# Patient Record
Sex: FEMALE | Race: BLACK OR AFRICAN AMERICAN | NOT HISPANIC OR LATINO | URBAN - METROPOLITAN AREA
[De-identification: names, ages, dates, MRNs, and addresses within clinical notes are randomized per-mention and may not be internally consistent; named-entity substitution may affect disease eponyms.]

---

## 2017-06-28 ENCOUNTER — EMERGENCY (EMERGENCY)
Facility: HOSPITAL | Age: 32
LOS: 0 days | Discharge: HOME | End: 2017-06-28
Attending: EMERGENCY MEDICINE
Payer: MEDICAID

## 2017-06-28 VITALS
OXYGEN SATURATION: 98 % | HEART RATE: 57 BPM | WEIGHT: 156.09 LBS | RESPIRATION RATE: 16 BRPM | HEIGHT: 63 IN | TEMPERATURE: 99 F | SYSTOLIC BLOOD PRESSURE: 116 MMHG | DIASTOLIC BLOOD PRESSURE: 86 MMHG

## 2017-06-28 PROCEDURE — 99283 EMERGENCY DEPT VISIT LOW MDM: CPT

## 2017-06-28 RX ORDER — IBUPROFEN 200 MG
600 TABLET ORAL ONCE
Qty: 0 | Refills: 0 | Status: COMPLETED | OUTPATIENT
Start: 2017-06-28 | End: 2017-06-28

## 2017-06-28 RX ORDER — IBUPROFEN 200 MG
1 TABLET ORAL
Qty: 15 | Refills: 0 | OUTPATIENT
Start: 2017-06-28 | End: 2017-07-03

## 2017-06-28 RX ORDER — PENICILLIN V POTASSIUM 250 MG
1 TABLET ORAL
Qty: 28 | Refills: 0 | OUTPATIENT
Start: 2017-06-28 | End: 2017-07-05

## 2017-06-28 RX ORDER — PENICILLIN V POTASSIUM 250 MG
500 TABLET ORAL ONCE
Qty: 0 | Refills: 0 | Status: COMPLETED | OUTPATIENT
Start: 2017-06-28 | End: 2017-06-28

## 2017-06-28 RX ADMIN — Medication 600 MILLIGRAM(S): at 20:57

## 2017-06-28 RX ADMIN — Medication 500 MILLIGRAM(S): at 20:57

## 2017-06-28 NOTE — ED PROVIDER NOTE - ENMT, MLM
Airway patent, Nasal mucosa clear. Mouth with normal mucosa. Throat has no vesicles, no oropharyngeal exudates and uvula is midline. Right lower molar Griggs 1 fracture, no abscess

## 2017-06-28 NOTE — ED ADULT TRIAGE NOTE - CHIEF COMPLAINT QUOTE
Patient reports "Bad headache from tooth." Denies photophobia. Denies nausea or vomiting. Pain radiates from jaw to head. Pain present since this afternoon. Patient reports "cavity." Denies head trauma Patient actively breast feeding.

## 2017-06-29 DIAGNOSIS — K02.9 DENTAL CARIES, UNSPECIFIED: ICD-10-CM

## 2017-06-29 DIAGNOSIS — K08.89 OTHER SPECIFIED DISORDERS OF TEETH AND SUPPORTING STRUCTURES: ICD-10-CM

## 2018-08-13 ENCOUNTER — ASOB RESULT (OUTPATIENT)
Age: 33
End: 2018-08-13

## 2018-08-13 ENCOUNTER — APPOINTMENT (OUTPATIENT)
Dept: ANTEPARTUM | Facility: CLINIC | Age: 33
End: 2018-08-13
Payer: MEDICAID

## 2018-08-13 PROCEDURE — 76811 OB US DETAILED SNGL FETUS: CPT

## 2018-08-15 ENCOUNTER — APPOINTMENT (OUTPATIENT)
Dept: ANTEPARTUM | Facility: CLINIC | Age: 33
End: 2018-08-15

## 2018-10-29 ENCOUNTER — APPOINTMENT (OUTPATIENT)
Dept: ANTEPARTUM | Facility: CLINIC | Age: 33
End: 2018-10-29

## 2018-11-21 ENCOUNTER — ASOB RESULT (OUTPATIENT)
Age: 33
End: 2018-11-21

## 2018-11-21 ENCOUNTER — APPOINTMENT (OUTPATIENT)
Dept: ANTEPARTUM | Facility: CLINIC | Age: 33
End: 2018-11-21
Payer: MEDICAID

## 2018-11-21 PROCEDURE — 76819 FETAL BIOPHYS PROFIL W/O NST: CPT

## 2018-11-21 PROCEDURE — 99201 OFFICE OUTPATIENT NEW 10 MINUTES: CPT | Mod: 25,TH

## 2018-11-21 PROCEDURE — 76805 OB US >/= 14 WKS SNGL FETUS: CPT

## 2018-12-05 ENCOUNTER — TRANSCRIPTION ENCOUNTER (OUTPATIENT)
Age: 33
End: 2018-12-05

## 2018-12-05 ENCOUNTER — INPATIENT (INPATIENT)
Facility: HOSPITAL | Age: 33
LOS: 4 days | Discharge: HOME | End: 2018-12-10
Attending: OBSTETRICS & GYNECOLOGY | Admitting: OBSTETRICS & GYNECOLOGY
Payer: MEDICAID

## 2018-12-05 VITALS — HEIGHT: 63 IN | WEIGHT: 176.37 LBS

## 2018-12-05 DIAGNOSIS — Z3A.00 WEEKS OF GESTATION OF PREGNANCY NOT SPECIFIED: ICD-10-CM

## 2018-12-05 DIAGNOSIS — O26.899 OTHER SPECIFIED PREGNANCY RELATED CONDITIONS, UNSPECIFIED TRIMESTER: ICD-10-CM

## 2018-12-05 LAB
BASOPHILS # BLD AUTO: 0.01 K/UL — SIGNIFICANT CHANGE UP (ref 0–0.2)
BASOPHILS NFR BLD AUTO: 0.1 % — SIGNIFICANT CHANGE UP (ref 0–2)
BLD GP AB SCN SERPL QL: NEGATIVE — SIGNIFICANT CHANGE UP
EOSINOPHIL # BLD AUTO: 0.05 K/UL — SIGNIFICANT CHANGE UP (ref 0–0.5)
EOSINOPHIL NFR BLD AUTO: 0.6 % — SIGNIFICANT CHANGE UP (ref 0–6)
HCT VFR BLD CALC: 35.7 % — SIGNIFICANT CHANGE UP (ref 34.5–45)
HGB BLD-MCNC: 11.3 G/DL — LOW (ref 11.5–15.5)
IMM GRANULOCYTES # BLD AUTO: 0.04 # — SIGNIFICANT CHANGE UP
IMM GRANULOCYTES NFR BLD AUTO: 0.5 % — SIGNIFICANT CHANGE UP (ref 0–1.5)
LYMPHOCYTES # BLD AUTO: 1.94 K/UL — SIGNIFICANT CHANGE UP (ref 1–3.3)
LYMPHOCYTES # BLD AUTO: 23 % — SIGNIFICANT CHANGE UP (ref 13–44)
MCHC RBC-ENTMCNC: 27.2 PG — SIGNIFICANT CHANGE UP (ref 27–34)
MCHC RBC-ENTMCNC: 31.7 % — LOW (ref 32–36)
MCV RBC AUTO: 85.8 FL — SIGNIFICANT CHANGE UP (ref 80–100)
MONOCYTES # BLD AUTO: 0.71 K/UL — SIGNIFICANT CHANGE UP (ref 0–0.9)
MONOCYTES NFR BLD AUTO: 8.4 % — SIGNIFICANT CHANGE UP (ref 2–14)
NEUTROPHILS # BLD AUTO: 5.67 K/UL — SIGNIFICANT CHANGE UP (ref 1.8–7.4)
NEUTROPHILS NFR BLD AUTO: 67.4 % — SIGNIFICANT CHANGE UP (ref 43–77)
NRBC # FLD: 0 — SIGNIFICANT CHANGE UP
PLATELET # BLD AUTO: 125 K/UL — LOW (ref 150–400)
PMV BLD: 12.2 FL — SIGNIFICANT CHANGE UP (ref 7–13)
RBC # BLD: 4.16 M/UL — SIGNIFICANT CHANGE UP (ref 3.8–5.2)
RBC # FLD: 16 % — HIGH (ref 10.3–14.5)
RH IG SCN BLD-IMP: POSITIVE — SIGNIFICANT CHANGE UP
WBC # BLD: 8.42 K/UL — SIGNIFICANT CHANGE UP (ref 3.8–10.5)
WBC # FLD AUTO: 8.42 K/UL — SIGNIFICANT CHANGE UP (ref 3.8–10.5)

## 2018-12-05 RX ORDER — SODIUM CHLORIDE 9 MG/ML
1000 INJECTION, SOLUTION INTRAVENOUS
Qty: 0 | Refills: 0 | Status: DISCONTINUED | OUTPATIENT
Start: 2018-12-05 | End: 2018-12-06

## 2018-12-05 RX ORDER — OXYTOCIN 10 UNIT/ML
333.33 VIAL (ML) INJECTION
Qty: 20 | Refills: 0 | Status: DISCONTINUED | OUTPATIENT
Start: 2018-12-05 | End: 2018-12-07

## 2018-12-05 RX ORDER — CITRIC ACID/SODIUM CITRATE 300-500 MG
15 SOLUTION, ORAL ORAL EVERY 4 HOURS
Qty: 0 | Refills: 0 | Status: DISCONTINUED | OUTPATIENT
Start: 2018-12-05 | End: 2018-12-06

## 2018-12-05 RX ORDER — OXYTOCIN 10 UNIT/ML
2 VIAL (ML) INJECTION
Qty: 30 | Refills: 0 | Status: DISCONTINUED | OUTPATIENT
Start: 2018-12-05 | End: 2018-12-06

## 2018-12-05 RX ORDER — INFLUENZA VIRUS VACCINE 15; 15; 15; 15 UG/.5ML; UG/.5ML; UG/.5ML; UG/.5ML
0.5 SUSPENSION INTRAMUSCULAR ONCE
Qty: 0 | Refills: 0 | Status: DISCONTINUED | OUTPATIENT
Start: 2018-12-05 | End: 2018-12-10

## 2018-12-05 RX ORDER — OXYTOCIN 10 UNIT/ML
333.33 VIAL (ML) INJECTION
Qty: 20 | Refills: 0 | Status: COMPLETED | OUTPATIENT
Start: 2018-12-05

## 2018-12-05 RX ADMIN — SODIUM CHLORIDE 250 MILLILITER(S): 9 INJECTION, SOLUTION INTRAVENOUS at 17:13

## 2018-12-05 RX ADMIN — SODIUM CHLORIDE 250 MILLILITER(S): 9 INJECTION, SOLUTION INTRAVENOUS at 19:16

## 2018-12-06 ENCOUNTER — TRANSCRIPTION ENCOUNTER (OUTPATIENT)
Age: 33
End: 2018-12-06

## 2018-12-06 ENCOUNTER — RESULT REVIEW (OUTPATIENT)
Age: 33
End: 2018-12-06

## 2018-12-06 LAB
HCT VFR BLD CALC: 35.1 % — SIGNIFICANT CHANGE UP (ref 34.5–45)
HGB BLD-MCNC: 11.1 G/DL — LOW (ref 11.5–15.5)
MCHC RBC-ENTMCNC: 26.3 PG — LOW (ref 27–34)
MCHC RBC-ENTMCNC: 31.6 % — LOW (ref 32–36)
MCV RBC AUTO: 83.2 FL — SIGNIFICANT CHANGE UP (ref 80–100)
NRBC # FLD: 0 — SIGNIFICANT CHANGE UP
PLATELET # BLD AUTO: 100 K/UL — LOW (ref 150–400)
PMV BLD: 11.3 FL — SIGNIFICANT CHANGE UP (ref 7–13)
RBC # BLD: 4.22 M/UL — SIGNIFICANT CHANGE UP (ref 3.8–5.2)
RBC # FLD: 15.9 % — HIGH (ref 10.3–14.5)
T PALLIDUM AB TITR SER: NEGATIVE — SIGNIFICANT CHANGE UP
WBC # BLD: 16.99 K/UL — HIGH (ref 3.8–10.5)
WBC # FLD AUTO: 16.99 K/UL — HIGH (ref 3.8–10.5)

## 2018-12-06 PROCEDURE — 88307 TISSUE EXAM BY PATHOLOGIST: CPT | Mod: 26

## 2018-12-06 RX ORDER — TETANUS TOXOID, REDUCED DIPHTHERIA TOXOID AND ACELLULAR PERTUSSIS VACCINE, ADSORBED 5; 2.5; 8; 8; 2.5 [IU]/.5ML; [IU]/.5ML; UG/.5ML; UG/.5ML; UG/.5ML
0.5 SUSPENSION INTRAMUSCULAR ONCE
Qty: 0 | Refills: 0 | Status: DISCONTINUED | OUTPATIENT
Start: 2018-12-06 | End: 2018-12-10

## 2018-12-06 RX ORDER — OXYTOCIN 10 UNIT/ML
41.67 VIAL (ML) INJECTION
Qty: 20 | Refills: 0 | Status: DISCONTINUED | OUTPATIENT
Start: 2018-12-06 | End: 2018-12-06

## 2018-12-06 RX ORDER — OXYCODONE HYDROCHLORIDE 5 MG/1
5 TABLET ORAL
Qty: 0 | Refills: 0 | Status: COMPLETED | OUTPATIENT
Start: 2018-12-06 | End: 2018-12-13

## 2018-12-06 RX ORDER — DIPHENHYDRAMINE HCL 50 MG
25 CAPSULE ORAL EVERY 6 HOURS
Qty: 0 | Refills: 0 | Status: DISCONTINUED | OUTPATIENT
Start: 2018-12-06 | End: 2018-12-10

## 2018-12-06 RX ORDER — IBUPROFEN 200 MG
600 TABLET ORAL EVERY 6 HOURS
Qty: 0 | Refills: 0 | Status: COMPLETED | OUTPATIENT
Start: 2018-12-06 | End: 2019-11-04

## 2018-12-06 RX ORDER — KETOROLAC TROMETHAMINE 30 MG/ML
30 SYRINGE (ML) INJECTION EVERY 6 HOURS
Qty: 0 | Refills: 0 | Status: DISCONTINUED | OUTPATIENT
Start: 2018-12-06 | End: 2018-12-07

## 2018-12-06 RX ORDER — DOCUSATE SODIUM 100 MG
100 CAPSULE ORAL
Qty: 0 | Refills: 0 | Status: DISCONTINUED | OUTPATIENT
Start: 2018-12-06 | End: 2018-12-10

## 2018-12-06 RX ORDER — CITRIC ACID/SODIUM CITRATE 300-500 MG
30 SOLUTION, ORAL ORAL ONCE
Qty: 0 | Refills: 0 | Status: DISCONTINUED | OUTPATIENT
Start: 2018-12-06 | End: 2018-12-06

## 2018-12-06 RX ORDER — SODIUM CHLORIDE 9 MG/ML
1000 INJECTION, SOLUTION INTRAVENOUS
Qty: 0 | Refills: 0 | Status: DISCONTINUED | OUTPATIENT
Start: 2018-12-06 | End: 2018-12-07

## 2018-12-06 RX ORDER — FERROUS SULFATE 325(65) MG
325 TABLET ORAL DAILY
Qty: 0 | Refills: 0 | Status: DISCONTINUED | OUTPATIENT
Start: 2018-12-06 | End: 2018-12-10

## 2018-12-06 RX ORDER — SIMETHICONE 80 MG/1
80 TABLET, CHEWABLE ORAL EVERY 4 HOURS
Qty: 0 | Refills: 0 | Status: DISCONTINUED | OUTPATIENT
Start: 2018-12-06 | End: 2018-12-10

## 2018-12-06 RX ORDER — ACETAMINOPHEN 500 MG
975 TABLET ORAL EVERY 6 HOURS
Qty: 0 | Refills: 0 | Status: DISCONTINUED | OUTPATIENT
Start: 2018-12-06 | End: 2018-12-10

## 2018-12-06 RX ORDER — ONDANSETRON 8 MG/1
4 TABLET, FILM COATED ORAL ONCE
Qty: 0 | Refills: 0 | Status: COMPLETED | OUTPATIENT
Start: 2018-12-06 | End: 2018-12-06

## 2018-12-06 RX ORDER — SODIUM CHLORIDE 9 MG/ML
1000 INJECTION, SOLUTION INTRAVENOUS ONCE
Qty: 0 | Refills: 0 | Status: COMPLETED | OUTPATIENT
Start: 2018-12-06 | End: 2018-12-06

## 2018-12-06 RX ORDER — OXYCODONE HYDROCHLORIDE 5 MG/1
5 TABLET ORAL EVERY 4 HOURS
Qty: 0 | Refills: 0 | Status: COMPLETED | OUTPATIENT
Start: 2018-12-06 | End: 2018-12-13

## 2018-12-06 RX ORDER — HYDROMORPHONE HYDROCHLORIDE 2 MG/ML
0.5 INJECTION INTRAMUSCULAR; INTRAVENOUS; SUBCUTANEOUS
Qty: 0 | Refills: 0 | Status: DISCONTINUED | OUTPATIENT
Start: 2018-12-06 | End: 2018-12-06

## 2018-12-06 RX ORDER — GLYCERIN ADULT
1 SUPPOSITORY, RECTAL RECTAL AT BEDTIME
Qty: 0 | Refills: 0 | Status: DISCONTINUED | OUTPATIENT
Start: 2018-12-06 | End: 2018-12-10

## 2018-12-06 RX ORDER — FAMOTIDINE 10 MG/ML
20 INJECTION INTRAVENOUS ONCE
Qty: 0 | Refills: 0 | Status: DISCONTINUED | OUTPATIENT
Start: 2018-12-06 | End: 2018-12-06

## 2018-12-06 RX ORDER — HEPARIN SODIUM 5000 [USP'U]/ML
5000 INJECTION INTRAVENOUS; SUBCUTANEOUS EVERY 12 HOURS
Qty: 0 | Refills: 0 | Status: DISCONTINUED | OUTPATIENT
Start: 2018-12-06 | End: 2018-12-10

## 2018-12-06 RX ORDER — LANOLIN
1 OINTMENT (GRAM) TOPICAL
Qty: 0 | Refills: 0 | Status: DISCONTINUED | OUTPATIENT
Start: 2018-12-06 | End: 2018-12-10

## 2018-12-06 RX ORDER — OXYTOCIN 10 UNIT/ML
41.67 VIAL (ML) INJECTION
Qty: 20 | Refills: 0 | Status: DISCONTINUED | OUTPATIENT
Start: 2018-12-06 | End: 2018-12-07

## 2018-12-06 RX ORDER — SODIUM CHLORIDE 9 MG/ML
1000 INJECTION, SOLUTION INTRAVENOUS
Qty: 0 | Refills: 0 | Status: DISCONTINUED | OUTPATIENT
Start: 2018-12-06 | End: 2018-12-06

## 2018-12-06 RX ORDER — METOCLOPRAMIDE HCL 10 MG
10 TABLET ORAL ONCE
Qty: 0 | Refills: 0 | Status: DISCONTINUED | OUTPATIENT
Start: 2018-12-06 | End: 2018-12-06

## 2018-12-06 RX ADMIN — Medication 10 MILLIGRAM(S): at 11:55

## 2018-12-06 RX ADMIN — Medication 30 MILLILITER(S): at 11:51

## 2018-12-06 RX ADMIN — Medication 125 MILLIUNIT(S)/MIN: at 14:43

## 2018-12-06 RX ADMIN — SODIUM CHLORIDE 250 MILLILITER(S): 9 INJECTION, SOLUTION INTRAVENOUS at 10:34

## 2018-12-06 RX ADMIN — HYDROMORPHONE HYDROCHLORIDE 0.5 MILLIGRAM(S): 2 INJECTION INTRAMUSCULAR; INTRAVENOUS; SUBCUTANEOUS at 15:59

## 2018-12-06 RX ADMIN — FAMOTIDINE 20 MILLIGRAM(S): 10 INJECTION INTRAVENOUS at 11:55

## 2018-12-06 RX ADMIN — HEPARIN SODIUM 5000 UNIT(S): 5000 INJECTION INTRAVENOUS; SUBCUTANEOUS at 20:52

## 2018-12-06 RX ADMIN — SODIUM CHLORIDE 2000 MILLILITER(S): 9 INJECTION, SOLUTION INTRAVENOUS at 13:45

## 2018-12-06 RX ADMIN — SODIUM CHLORIDE 75 MILLILITER(S): 9 INJECTION, SOLUTION INTRAVENOUS at 14:42

## 2018-12-06 RX ADMIN — SODIUM CHLORIDE 2000 MILLILITER(S): 9 INJECTION, SOLUTION INTRAVENOUS at 09:00

## 2018-12-06 RX ADMIN — Medication 2 MILLIUNIT(S)/MIN: at 10:33

## 2018-12-06 RX ADMIN — HYDROMORPHONE HYDROCHLORIDE 0.5 MILLIGRAM(S): 2 INJECTION INTRAMUSCULAR; INTRAVENOUS; SUBCUTANEOUS at 16:24

## 2018-12-06 RX ADMIN — Medication 0.2 MILLIGRAM(S): at 16:25

## 2018-12-06 RX ADMIN — Medication 2 MILLIUNIT(S)/MIN: at 03:24

## 2018-12-06 RX ADMIN — ONDANSETRON 4 MILLIGRAM(S): 8 TABLET, FILM COATED ORAL at 21:40

## 2018-12-06 RX ADMIN — ONDANSETRON 4 MILLIGRAM(S): 8 TABLET, FILM COATED ORAL at 09:52

## 2018-12-06 RX ADMIN — Medication 0.2 MILLIGRAM(S): at 20:52

## 2018-12-06 NOTE — DISCHARGE NOTE OB - MEDICATION SUMMARY - MEDICATIONS TO STOP TAKING
I will STOP taking the medications listed below when I get home from the hospital:    penicillin V potassium 500 mg oral tablet  -- 1 tab(s) by mouth every 6 hours  -- Finish all this medication unless otherwise directed by prescriber.  Take medication on an empty stomach 1 hour before or 2 to 3 hours after a meal unless otherwise directed by your doctor.

## 2018-12-06 NOTE — DISCHARGE NOTE OB - PATIENT PORTAL LINK FT
You can access the [facility] Patient Portal, offered by [facility], by registering with the following website: [email or web address]

## 2018-12-06 NOTE — DISCHARGE NOTE OB - CARE PROVIDER_API CALL
Jessica Feliz), [address]  [address] [place] [address]  Phone: [phone]  Fax: [phone]

## 2018-12-06 NOTE — DISCHARGE NOTE OB - CARE PLAN
Principal Discharge DX:	 delivery delivered  Goal:	Routine recovery  Assessment and plan of treatment:	Routine postop care

## 2018-12-07 LAB
BASOPHILS # BLD AUTO: 0.02 K/UL — SIGNIFICANT CHANGE UP (ref 0–0.2)
BASOPHILS NFR BLD AUTO: 0.1 % — SIGNIFICANT CHANGE UP (ref 0–2)
EOSINOPHIL # BLD AUTO: 0 K/UL — SIGNIFICANT CHANGE UP (ref 0–0.5)
EOSINOPHIL NFR BLD AUTO: 0 % — SIGNIFICANT CHANGE UP (ref 0–6)
HCT VFR BLD CALC: 29.3 % — LOW (ref 34.5–45)
HGB BLD-MCNC: 9.4 G/DL — LOW (ref 11.5–15.5)
IMM GRANULOCYTES # BLD AUTO: 0.08 # — SIGNIFICANT CHANGE UP
IMM GRANULOCYTES NFR BLD AUTO: 0.5 % — SIGNIFICANT CHANGE UP (ref 0–1.5)
LYMPHOCYTES # BLD AUTO: 1.34 K/UL — SIGNIFICANT CHANGE UP (ref 1–3.3)
LYMPHOCYTES # BLD AUTO: 9.2 % — LOW (ref 13–44)
MCHC RBC-ENTMCNC: 26.7 PG — LOW (ref 27–34)
MCHC RBC-ENTMCNC: 32.1 % — SIGNIFICANT CHANGE UP (ref 32–36)
MCV RBC AUTO: 83.2 FL — SIGNIFICANT CHANGE UP (ref 80–100)
MONOCYTES # BLD AUTO: 0.62 K/UL — SIGNIFICANT CHANGE UP (ref 0–0.9)
MONOCYTES NFR BLD AUTO: 4.3 % — SIGNIFICANT CHANGE UP (ref 2–14)
NEUTROPHILS # BLD AUTO: 12.52 K/UL — HIGH (ref 1.8–7.4)
NEUTROPHILS NFR BLD AUTO: 85.9 % — HIGH (ref 43–77)
NRBC # FLD: 0 — SIGNIFICANT CHANGE UP
PLATELET # BLD AUTO: 99 K/UL — LOW (ref 150–400)
PMV BLD: 12.6 FL — SIGNIFICANT CHANGE UP (ref 7–13)
RBC # BLD: 3.52 M/UL — LOW (ref 3.8–5.2)
RBC # FLD: 16 % — HIGH (ref 10.3–14.5)
WBC # BLD: 14.58 K/UL — HIGH (ref 3.8–10.5)
WBC # FLD AUTO: 14.58 K/UL — HIGH (ref 3.8–10.5)

## 2018-12-07 RX ORDER — OXYCODONE HYDROCHLORIDE 5 MG/1
5 TABLET ORAL
Qty: 0 | Refills: 0 | Status: DISCONTINUED | OUTPATIENT
Start: 2018-12-07 | End: 2018-12-10

## 2018-12-07 RX ORDER — OXYCODONE HYDROCHLORIDE 5 MG/1
5 TABLET ORAL EVERY 4 HOURS
Qty: 0 | Refills: 0 | Status: DISCONTINUED | OUTPATIENT
Start: 2018-12-07 | End: 2018-12-10

## 2018-12-07 RX ORDER — IBUPROFEN 200 MG
600 TABLET ORAL EVERY 6 HOURS
Qty: 0 | Refills: 0 | Status: DISCONTINUED | OUTPATIENT
Start: 2018-12-07 | End: 2018-12-10

## 2018-12-07 RX ORDER — MAGNESIUM HYDROXIDE 400 MG/1
30 TABLET, CHEWABLE ORAL DAILY
Qty: 0 | Refills: 0 | Status: DISCONTINUED | OUTPATIENT
Start: 2018-12-07 | End: 2018-12-10

## 2018-12-07 RX ADMIN — Medication 0.2 MILLIGRAM(S): at 13:25

## 2018-12-07 RX ADMIN — Medication 30 MILLIGRAM(S): at 00:06

## 2018-12-07 RX ADMIN — Medication 30 MILLIGRAM(S): at 00:36

## 2018-12-07 RX ADMIN — HEPARIN SODIUM 5000 UNIT(S): 5000 INJECTION INTRAVENOUS; SUBCUTANEOUS at 21:17

## 2018-12-07 RX ADMIN — Medication 975 MILLIGRAM(S): at 05:56

## 2018-12-07 RX ADMIN — Medication 975 MILLIGRAM(S): at 18:00

## 2018-12-07 RX ADMIN — Medication 600 MILLIGRAM(S): at 15:19

## 2018-12-07 RX ADMIN — Medication 975 MILLIGRAM(S): at 00:05

## 2018-12-07 RX ADMIN — Medication 600 MILLIGRAM(S): at 21:46

## 2018-12-07 RX ADMIN — Medication 975 MILLIGRAM(S): at 18:30

## 2018-12-07 RX ADMIN — Medication 975 MILLIGRAM(S): at 05:26

## 2018-12-07 RX ADMIN — Medication 0.2 MILLIGRAM(S): at 00:06

## 2018-12-07 RX ADMIN — HEPARIN SODIUM 5000 UNIT(S): 5000 INJECTION INTRAVENOUS; SUBCUTANEOUS at 09:31

## 2018-12-07 RX ADMIN — Medication 600 MILLIGRAM(S): at 15:20

## 2018-12-07 RX ADMIN — Medication 30 MILLIGRAM(S): at 05:26

## 2018-12-07 RX ADMIN — MAGNESIUM HYDROXIDE 30 MILLILITER(S): 400 TABLET, CHEWABLE ORAL at 18:25

## 2018-12-07 RX ADMIN — Medication 30 MILLIGRAM(S): at 05:56

## 2018-12-07 RX ADMIN — Medication 0.2 MILLIGRAM(S): at 05:26

## 2018-12-07 RX ADMIN — Medication 0.2 MILLIGRAM(S): at 09:31

## 2018-12-07 RX ADMIN — Medication 975 MILLIGRAM(S): at 23:34

## 2018-12-07 RX ADMIN — Medication 975 MILLIGRAM(S): at 00:35

## 2018-12-07 RX ADMIN — Medication 600 MILLIGRAM(S): at 21:16

## 2018-12-07 RX ADMIN — SIMETHICONE 80 MILLIGRAM(S): 80 TABLET, CHEWABLE ORAL at 05:28

## 2018-12-07 RX ADMIN — Medication 325 MILLIGRAM(S): at 15:16

## 2018-12-07 RX ADMIN — SIMETHICONE 80 MILLIGRAM(S): 80 TABLET, CHEWABLE ORAL at 15:18

## 2018-12-08 RX ADMIN — Medication 975 MILLIGRAM(S): at 05:45

## 2018-12-08 RX ADMIN — Medication 975 MILLIGRAM(S): at 00:04

## 2018-12-08 RX ADMIN — Medication 975 MILLIGRAM(S): at 18:00

## 2018-12-08 RX ADMIN — Medication 100 MILLIGRAM(S): at 11:32

## 2018-12-08 RX ADMIN — HEPARIN SODIUM 5000 UNIT(S): 5000 INJECTION INTRAVENOUS; SUBCUTANEOUS at 09:07

## 2018-12-08 RX ADMIN — Medication 1 TABLET(S): at 11:31

## 2018-12-08 RX ADMIN — Medication 600 MILLIGRAM(S): at 12:32

## 2018-12-08 RX ADMIN — Medication 975 MILLIGRAM(S): at 11:33

## 2018-12-08 RX ADMIN — Medication 975 MILLIGRAM(S): at 17:27

## 2018-12-08 RX ADMIN — Medication 600 MILLIGRAM(S): at 17:26

## 2018-12-08 RX ADMIN — Medication 600 MILLIGRAM(S): at 05:46

## 2018-12-08 RX ADMIN — HEPARIN SODIUM 5000 UNIT(S): 5000 INJECTION INTRAVENOUS; SUBCUTANEOUS at 21:30

## 2018-12-08 RX ADMIN — Medication 600 MILLIGRAM(S): at 18:00

## 2018-12-08 RX ADMIN — Medication 975 MILLIGRAM(S): at 06:15

## 2018-12-08 RX ADMIN — Medication 975 MILLIGRAM(S): at 12:32

## 2018-12-08 RX ADMIN — Medication 600 MILLIGRAM(S): at 11:32

## 2018-12-08 RX ADMIN — Medication 325 MILLIGRAM(S): at 11:32

## 2018-12-08 RX ADMIN — Medication 600 MILLIGRAM(S): at 06:15

## 2018-12-08 NOTE — PROGRESS NOTE ADULT - ASSESSMENT
A/P: 34yo POD#2 s/p pLTCS for cat II tracing. EBL 1500 2/2 atony s/p IM Methergine. Pt currently completing methergine series.  Patient is stable and doing well post-operatively.
A/P: 34yo POD#1 s/p pLTCS for cat II tracing. EBL 1500 2/2 atony s/p IM Methergine. Pt currently completing methergine series.  Patient is stable and doing well post-operatively.

## 2018-12-08 NOTE — LACTATION INITIAL EVALUATION - LACTATION INTERVENTIONS
assisted with deep latch and positioning  .efs  discussed  compression at  breast when  nbn  stops  drinking  and  is  still sucking.  instructed  to offer both  breast at a feeding ,feed on cue and safe  skin to skin. . reviewed  late    behavior,  discussed and  demo  strategies to wake  nbn  at  breast  .  if  nbn  not  breastfeeding  effectively  hand  express  and  pump  and   give  teaspoons  between  feedings alternative  feeding   method./initiate skin to skin/initiate dual electric pump routine/initiate hand expression routine

## 2018-12-08 NOTE — LACTATION INITIAL EVALUATION - INTERVENTION OUTCOME
nbn  sleepy  needing  stimulation  at  breast .  recommend   to  increase  feeds  .  discussed  compression at  breast when  nbn  stops  drinking  and  is  still sucking.  if  nbn  not  breastfeeding  effectively  hand  express  and  pump  and   give  teaspoons  between  feedings alternative  feeding   method.  . rn aware  .  reviewed  alternate  methods  of  feeding.    continue  to  follow./verbalizes understanding

## 2018-12-08 NOTE — PROGRESS NOTE ADULT - PROBLEM SELECTOR PLAN 1
- Continue regular diet.  - Increase ambulation.  - Continue motrin, tylenol, oxycodone PRN for pain control.   - s/p methergine tahira Spencer PGY-1  Pager #: [phone]
- Continue regular diet.  - Increase ambulation.  - Continue motrin, tylenol, oxycodone PRN for pain control.   - Complete methergine series  - F/u AM CBC    Franci Montoya, PGY-1  Pager# [phone]

## 2018-12-09 RX ADMIN — Medication 100 MILLIGRAM(S): at 08:51

## 2018-12-09 RX ADMIN — Medication 975 MILLIGRAM(S): at 00:28

## 2018-12-09 RX ADMIN — Medication 600 MILLIGRAM(S): at 06:26

## 2018-12-09 RX ADMIN — Medication 975 MILLIGRAM(S): at 12:15

## 2018-12-09 RX ADMIN — Medication 600 MILLIGRAM(S): at 12:55

## 2018-12-09 RX ADMIN — Medication 975 MILLIGRAM(S): at 18:40

## 2018-12-09 RX ADMIN — HEPARIN SODIUM 5000 UNIT(S): 5000 INJECTION INTRAVENOUS; SUBCUTANEOUS at 08:49

## 2018-12-09 RX ADMIN — Medication 600 MILLIGRAM(S): at 18:40

## 2018-12-09 RX ADMIN — Medication 975 MILLIGRAM(S): at 06:25

## 2018-12-09 RX ADMIN — Medication 325 MILLIGRAM(S): at 11:38

## 2018-12-09 RX ADMIN — Medication 975 MILLIGRAM(S): at 07:00

## 2018-12-09 RX ADMIN — HEPARIN SODIUM 5000 UNIT(S): 5000 INJECTION INTRAVENOUS; SUBCUTANEOUS at 22:27

## 2018-12-09 RX ADMIN — Medication 975 MILLIGRAM(S): at 12:55

## 2018-12-09 RX ADMIN — Medication 975 MILLIGRAM(S): at 01:10

## 2018-12-09 RX ADMIN — Medication 1 TABLET(S): at 11:38

## 2018-12-09 RX ADMIN — Medication 600 MILLIGRAM(S): at 07:00

## 2018-12-09 RX ADMIN — Medication 600 MILLIGRAM(S): at 01:10

## 2018-12-09 RX ADMIN — Medication 600 MILLIGRAM(S): at 12:15

## 2018-12-09 RX ADMIN — Medication 975 MILLIGRAM(S): at 18:12

## 2018-12-09 RX ADMIN — Medication 600 MILLIGRAM(S): at 00:28

## 2018-12-09 RX ADMIN — Medication 600 MILLIGRAM(S): at 18:12

## 2018-12-10 VITALS
SYSTOLIC BLOOD PRESSURE: 137 MMHG | HEART RATE: 67 BPM | OXYGEN SATURATION: 99 % | DIASTOLIC BLOOD PRESSURE: 70 MMHG | RESPIRATION RATE: 18 BRPM | TEMPERATURE: 98 F

## 2018-12-10 RX ADMIN — Medication 600 MILLIGRAM(S): at 07:41

## 2018-12-10 RX ADMIN — Medication 975 MILLIGRAM(S): at 06:45

## 2018-12-10 RX ADMIN — Medication 975 MILLIGRAM(S): at 07:41

## 2018-12-10 RX ADMIN — Medication 975 MILLIGRAM(S): at 01:13

## 2018-12-10 RX ADMIN — Medication 600 MILLIGRAM(S): at 06:45

## 2018-12-10 RX ADMIN — Medication 975 MILLIGRAM(S): at 00:30

## 2018-12-10 RX ADMIN — Medication 600 MILLIGRAM(S): at 01:13

## 2018-12-10 RX ADMIN — Medication 600 MILLIGRAM(S): at 00:30

## 2018-12-10 NOTE — PROGRESS NOTE ADULT - SUBJECTIVE AND OBJECTIVE BOX
OB Progress Note:  Delivery, POD#2    S: 32yo POD#2 s/p LTCS . Her pain is well controlled. She is tolerating a regular diet and passing flatus. Denies N/V. Denies CP/SOB/lightheadedness/dizziness. She is ambulating without difficulty. Voiding spontanously.     O:   Vital Signs Last 24 Hrs  T(C): 36.7 (08 Dec 2018 05:57), Max: 36.8 (07 Dec 2018 10:02)  T(F): 98 (08 Dec 2018 05:57), Max: 98.2 (07 Dec 2018 10:02)  HR: 65 (08 Dec 2018 05:57) (65 - 98)  BP: 117/70 (08 Dec 2018 05:57) (105/80 - 121/71)  BP(mean): --  RR: 18 (08 Dec 2018 05:57) (18 - 18)  SpO2: 100% (08 Dec 2018 05:57) (99% - 100%)    Labs:  Blood type: A Positive  Rubella IgG: RPR: Negative                          9.4<L>   14.58<H> >-----------< 99<L>    (  @ 05:35 )             29.3<L>                        11.1<L>   16.99<H> >-----------< 100<L>    (  @ 18:02 )             35.1                        11.3<L>   8.42 >-----------< 125<L>    (  @ 17:00 )             35.7                  PE:  General: NAD  Abdomen: Mildly distended, appropriately tender, incision c/d/i.  Extremities: No erythema, no pitting edema    A/P: 32yo POD#1 s/p LTCS.  Patient is stable and doing well post-operatively.    - Continue regular diet.  - Increase ambulation.  - Continue motrin, tylenol, oxycodone PRN for pain control.  vs. continue PCEA for pain.  - F/u AM RADHA Spencer PGY-1  Pager #: [phone]
Day ___1 of Anesthesia Pain Management Service    SUBJECTIVE:  Pain Scale Score	At rest: __none_ 	With Activity: __mild_ 	[x ] Refer to charted pain scores    THERAPY:    s/p _____3___ mg PF morphine     OBJECTIVE:    Sedation Score:	[ x] Alert	[ ] Drowsy	[ ] Arousable	[ ] Asleep	[ ] Unresponsive    Side Effects:	[x ] None	[ ] Nausea	[ ] Vomiting	[ ] Pruritus  		  [ ] Weakness		[ ] Numbness	[ ] Other:    Vital Signs Last 24 Hrs  T(C): 36.7 (07 Dec 2018 06:10), Max: 36.8 (06 Dec 2018 19:19)  T(F): 98.1 (07 Dec 2018 06:10), Max: 98.2 (06 Dec 2018 19:19)  HR: 69 (07 Dec 2018 06:10) (53 - 77)  BP: 97/56 (07 Dec 2018 06:10) (97/56 - 142/82)  BP(mean): 81 (06 Dec 2018 17:30) (73 - 99)  RR: 18 (07 Dec 2018 06:10) (7 - 23)  SpO2: 97% (07 Dec 2018 06:10) (97% - 100%)    ASSESSMENT/ PLAN  [x ] Patient transitioned to prn analgesics  [ x] Pain management per primary service, pain service to sign off   [ x]Documentation and Verification of current medications     Comments: No anesthetic complications.
OB Progress Note:  Delivery, POD#1    S: 32yo POD#1 s/p LTCS for cat II tracing. Her pain is well controlled. She is tolerating a regular diet. Not yet passing flatus. Dubon removed this AM, pt due to void. Denies N/V. Denies CP/SOB/lightheadedness/dizziness. She is ambulating without difficulty.     O:   Vital Signs Last 24 Hrs  T(C): 36.7 (07 Dec 2018 06:10), Max: 36.8 (06 Dec 2018 19:19)  T(F): 98.1 (07 Dec 2018 06:10), Max: 98.2 (06 Dec 2018 19:19)  HR: 69 (07 Dec 2018 06:10) (53 - 77)  BP: 97/56 (07 Dec 2018 06:10) (97/56 - 142/82)  BP(mean): 81 (06 Dec 2018 17:30) (73 - 99)  RR: 18 (07 Dec 2018 06:10) (7 - 23)  SpO2: 97% (07 Dec 2018 06:10) (97% - 100%)    Labs:  Blood type: A Positive  Rubella IgG: RPR: Negative                          9.4<L>   14.58<H> >-----------< 99<L>    (  @ 05:35 )             29.3<L>                        11.1<L>   16.99<H> >-----------< 100<L>    (  @ 18:02 )             35.1                        11.3<L>   8.42 >-----------< 125<L>    (  @ 17:00 )             35.7      PE:  General: NAD  Abdomen: Mildly distended, appropriately tender, incision c/d/i.  Extremities: No erythema, no pitting edema
Post-Operative Note, C/S  She is a  33y woman who is now post-operative day: 3    Subjective:  The patient feels well.  She is ambulating.   She is tolerating regular diet.  She denies nausea and vomiting; denies fever.  She is voiding.  Her pain is controlled; incisional pain is appropriate.  She reports normal postpartum bleeding.  She is breastfeeding.  She is formula feeding.    Physical exam:    Vital Signs Last 24 Hrs  T(C): 36.7 (09 Dec 2018 05:49), Max: 36.8 (08 Dec 2018 16:04)  T(F): 98.1 (09 Dec 2018 05:49), Max: 98.3 (08 Dec 2018 16:04)  HR: 63 (09 Dec 2018 05:49) (63 - 76)  BP: 125/79 (09 Dec 2018 05:49) (113/65 - 125/79)  BP(mean): --  RR: 18 (09 Dec 2018 05:49) (18 - 18)  SpO2: 100% (09 Dec 2018 05:49) (97% - 100%)    Gen: NAD  Breast: Soft, nontender, not engorged.  Abdomen: Soft, nontender, no distension , firm uterine fundus at umbilicus.  Incision: C/D/I.  Pelvic: Normal lochia noted  Ext: No calf tenderness    LABS:      Rubella status:     Allergies    No Known Allergies    Intolerances      MEDICATIONS  (STANDING):  acetaminophen   Tablet .. 975 milliGRAM(s) Oral every 6 hours  diphtheria/tetanus/pertussis (acellular) Vaccine (ADAcel) 0.5 milliLiter(s) IntraMuscular once  ferrous    sulfate 325 milliGRAM(s) Oral daily  heparin  Injectable 5000 Unit(s) SubCutaneous every 12 hours  ibuprofen  Tablet. 600 milliGRAM(s) Oral every 6 hours  influenza   Vaccine 0.5 milliLiter(s) IntraMuscular once  oxyCODONE    IR 5 milliGRAM(s) Oral every 3 hours  prenatal multivitamin 1 Tablet(s) Oral daily    MEDICATIONS  (PRN):  diphenhydrAMINE 25 milliGRAM(s) Oral every 6 hours PRN Itching  docusate sodium 100 milliGRAM(s) Oral two times a day PRN Stool Softening  glycerin Suppository - Adult 1 Suppository(s) Rectal at bedtime PRN Constipation  lanolin Ointment 1 Application(s) Topical every 3 hours PRN Sore Nipples  magnesium hydroxide Suspension 30 milliLiter(s) Oral daily PRN Constipation  oxyCODONE    IR 5 milliGRAM(s) Oral every 4 hours PRN Severe Pain (7 - 10)  simethicone 80 milliGRAM(s) Chew every 4 hours PRN Gas        Assessment and Plan  POD #3 s/p C/S.  Doing well.  Encourage ambulation.  Incisional care and PO instructions reviewed.  Discharge today.
Postop Day  __1_ s/p   C- Section    THERAPY:  [  ] Spinal morphine   (  ) mg  on (        )  [ x ] Epidural morphine   [  ] IV PCA Hydromorphone 1 mg/ml      Sedation Score:	  [ x ] Alert	    [  ] Drowsy        [  ] Arousable	[  ] Asleep	[  ] Unresponsive    Side Effects:	  [ x ] None	     [  ] Nausea        [  ] Pruritus        [  ] Weakness   [  ] Numbness        ASSESSMENT/ PLAN   [   ] Discontinue         [  ] Continue  [ x ]Documentation and Verification of current medications     Comments: Transitioned to prn oral analgesics by primary team. No anesthetic complication.
S: Patient doing well. Pain control adequate. Breastfeeding and formula feeding. Denies of s/s of hypovolemia.     O: Vital Signs Last 24 Hrs  T(C): 36.5 (10 Dec 2018 05:47), Max: 36.7 (09 Dec 2018 17:50)  T(F): 97.7 (10 Dec 2018 05:47), Max: 98 (09 Dec 2018 17:50)  HR: 63 (10 Dec 2018 05:47) (63 - 86)  BP: 133/81 (10 Dec 2018 05:47) (124/86 - 133/81)  BP(mean): --  RR: 18 (10 Dec 2018 05:47) (18 - 18)  SpO2: 96% (10 Dec 2018 05:47) (96% - 99%)    Gen: NAD  Abd: soft, NT, ND, fundus firm below umbilicus  Lochia: moderate  Incision: well approximated, clean, dry, intact, steri strips. Patient reports of burning sensation throughout incision, no s/s of infection  Ext: no tenderness    Medications:  acetaminophen   Tablet .. 975 milliGRAM(s) Oral every 6 hours  diphenhydrAMINE 25 milliGRAM(s) Oral every 6 hours PRN  diphtheria/tetanus/pertussis (acellular) Vaccine (ADAcel) 0.5 milliLiter(s) IntraMuscular once  docusate sodium 100 milliGRAM(s) Oral two times a day PRN  ferrous    sulfate 325 milliGRAM(s) Oral daily  glycerin Suppository - Adult 1 Suppository(s) Rectal at bedtime PRN  heparin  Injectable 5000 Unit(s) SubCutaneous every 12 hours  ibuprofen  Tablet. 600 milliGRAM(s) Oral every 6 hours  influenza   Vaccine 0.5 milliLiter(s) IntraMuscular once  lanolin Ointment 1 Application(s) Topical every 3 hours PRN  magnesium hydroxide Suspension 30 milliLiter(s) Oral daily PRN  oxyCODONE    IR 5 milliGRAM(s) Oral every 3 hours  oxyCODONE    IR 5 milliGRAM(s) Oral every 4 hours PRN  prenatal multivitamin 1 Tablet(s) Oral daily  simethicone 80 milliGRAM(s) Chew every 4 hours PRN    Labs:      A: 33y s/p C/S, no reports of concerns and doing well.     Plan: Continue routine postpartum care. Anticipate d/c home in the am.

## 2018-12-19 ENCOUNTER — APPOINTMENT (OUTPATIENT)
Dept: ANTEPARTUM | Facility: CLINIC | Age: 33
End: 2018-12-19

## 2018-12-24 LAB — SURGICAL PATHOLOGY STUDY: SIGNIFICANT CHANGE UP

## 2019-06-23 NOTE — ED ADULT NURSE NOTE - DOES PATIENT HAVE ADVANCE DIRECTIVE
education: None    Highest education level: None   Occupational History    None   Social Needs    Financial resource strain: None    Food insecurity:     Worry: None     Inability: None    Transportation needs:     Medical: None     Non-medical: None   Tobacco Use    Smoking status: Former Smoker     Packs/day: 1.00     Last attempt to quit: 2015     Years since quittin.0    Smokeless tobacco: Never Used   Substance and Sexual Activity    Alcohol use: No    Drug use: No    Sexual activity: None   Lifestyle    Physical activity:     Days per week: None     Minutes per session: None    Stress: None   Relationships    Social connections:     Talks on phone: None     Gets together: None     Attends Holiness service: None     Active member of club or organization: None     Attends meetings of clubs or organizations: None     Relationship status: None    Intimate partner violence:     Fear of current or ex partner: None     Emotionally abused: None     Physically abused: None     Forced sexual activity: None   Other Topics Concern    None   Social History Narrative    None       SCREENINGS             PHYSICAL EXAM    (up to 7 for level 4, 8 or more for level 5)     ED Triage Vitals [19 1748]   BP Temp Temp Source Pulse Resp SpO2 Height Weight   (!) 113/58 97.9 °F (36.6 °C) Tympanic 110 14 97 % -- --       Physical Exam   Constitutional: He is oriented to person, place, and time. He appears well-developed and well-nourished. No distress. HENT:   Mouth/Throat: Oropharynx is clear and moist.   Eyes: Conjunctivae are normal. Right eye exhibits no discharge. Neck: No tracheal deviation present. Cardiovascular: Normal rate, regular rhythm and normal heart sounds. Pulmonary/Chest: Effort normal and breath sounds normal. No respiratory distress. He has no wheezes. Abdominal: Soft. He exhibits no distension. There is no tenderness. Genitourinary:   Genitourinary Comments:  The penis is No

## 2020-03-02 ENCOUNTER — EMERGENCY (EMERGENCY)
Facility: HOSPITAL | Age: 35
LOS: 1 days | Discharge: HOME | End: 2020-03-02
Attending: EMERGENCY MEDICINE
Payer: MEDICAID

## 2020-03-02 VITALS
TEMPERATURE: 97 F | WEIGHT: 149.91 LBS | HEART RATE: 54 BPM | HEIGHT: 63 IN | OXYGEN SATURATION: 95 % | SYSTOLIC BLOOD PRESSURE: 127 MMHG | RESPIRATION RATE: 16 BRPM | DIASTOLIC BLOOD PRESSURE: 84 MMHG

## 2020-03-02 PROCEDURE — 73030 X-RAY EXAM OF SHOULDER: CPT | Mod: 26,RT

## 2020-03-02 PROCEDURE — 73030 X-RAY EXAM OF SHOULDER: CPT

## 2020-03-02 PROCEDURE — 99284 EMERGENCY DEPT VISIT MOD MDM: CPT

## 2020-03-02 PROCEDURE — 99283 EMERGENCY DEPT VISIT LOW MDM: CPT

## 2020-03-02 RX ORDER — LIDOCAINE 4 G/100G
1 CREAM TOPICAL ONCE
Refills: 0 | Status: COMPLETED | OUTPATIENT
Start: 2020-03-02 | End: 2020-03-02

## 2020-03-02 RX ORDER — IBUPROFEN 200 MG
600 TABLET ORAL ONCE
Refills: 0 | Status: COMPLETED | OUTPATIENT
Start: 2020-03-02 | End: 2020-03-02

## 2020-03-02 RX ORDER — ACETAMINOPHEN 500 MG
975 TABLET ORAL ONCE
Refills: 0 | Status: COMPLETED | OUTPATIENT
Start: 2020-03-02 | End: 2020-03-02

## 2020-03-02 RX ADMIN — Medication 975 MILLIGRAM(S): at 23:56

## 2020-03-02 RX ADMIN — LIDOCAINE 1 PATCH: 4 CREAM TOPICAL at 23:55

## 2020-03-02 RX ADMIN — Medication 600 MILLIGRAM(S): at 23:56

## 2020-03-02 NOTE — ED PROVIDER NOTE - PATIENT PORTAL LINK FT
You can access the FollowMyHealth Patient Portal offered by [facility] by registering at the following website: [email or web address]. By joining [employer]’s FollowMyHealth portal, you will also be able to view your health information using other applications (apps) compatible with our system.

## 2020-03-02 NOTE — ED PROVIDER NOTE - ATTENDING CONTRIBUTION TO CARE
Private Physician Can't recall  34y female pmh Asthma,Umibical hernia, No dm,htn,hld. PT comes to ed complains of trauma. Pt was cleaning under bed and had bed frame against wall and had it fall. struck Rt forearm, approx 2.5h ago. No other trauma. Private Physician Can't recall  34y female pmh Asthma,Umibical hernia, No dm,htn,hld. PT comes to ed complains of trauma. Pt was cleaning under bed and had bed frame against wall and had it fall. struck Rt forearm, approx 2.5h ago. No other trauma. During initial history pt was noted to be recording interaction with phone. Pt refused to turn it off and stop recording in ED. Nurse manager attempted to convince her to do same unsuccessfully. Security called and RN/Michael convinced pt to turn off phone. Care transferred to Dr Simone Jade MD, Facep

## 2020-03-02 NOTE — ED PROVIDER NOTE - OBJECTIVE STATEMENT
34y f PMHx asthma p/w R arm pain. Pt reports she was cleaning, had a metal bed frame supported against the wall and it fell on her. The bed frame struck her R shoulder and forearm. Since the accident she reports aching arm pain. No head trauma/LOC. Denies numbness, weakness, limited ROM, neck pain, HA, dizziness, n/v, CP, SOB. Pt was on her phone streaming the initial encounter, refused to turn off phone. Security was called.

## 2020-03-02 NOTE — ED PROVIDER NOTE - NSFOLLOWUPCLINICS_GEN_ALL_ED_FT
[facility] Orthopedic Surgery  Orthopedic Surgery  [address], 3rd & [address]th [place] [address]  Phone: [phone]  Fax:   Follow Up Time: 4-6 Days

## 2020-03-02 NOTE — ED PROVIDER NOTE - PROGRESS NOTE DETAILS
Patient re-evaluated. Imaging reviewed. Patient with negative XR shoulder, wants to go home, informed that HIV test has not been done yet, patient states she will get it done outpatient and does not want to wait.  Imaging results (if any), were reviewed with the patient. Patient understands to follow up with their regular doctor. Patient understands to return to the ED is symptoms worsen or progress. Discharge instructions were given to the patient and discussed with patient. Rx (if any) were electronically sent to patient's preferred pharmacy.

## 2020-03-02 NOTE — CHART NOTE - NSCHARTNOTEFT_GEN_A_CORE
Social Work ED Note:  Patient is a 34 year old  female presented to the ED after a bed frame fell on her and she is experiencing arm pain.  LMSW introduced herself to the patient and she verbalized understanding the role of the . Patient is alert and oriented x 4 spheres.  Patient reports she is staying between her mother's home and a friend's home as she is working with DSS for housing assistance.  Patient reports she has two young children ages 3 and 1 who are currently with their father.  LMSW explored with patient any safety concerns with the father.  Patient denied.  Per patient she cares for her children when their father is at work.  Patient endorsees periods of depression as she feel overwhelmed with her current living situation.  Patient explained she is very frustrated as she does not have secure housing and the relationship with her children's father.  Patient denies any domestic violence and substance abuse. LMSW explored suicidality.  Patient noted she is depressed but denies SI. Patient appears goal oriented for her children but overwhelmed with her current situation. LMSW encouraged patient to continue working collaboratively with DSS for housing assistance.  LMSW provided resources such as the [facility] clinic for outpatient Mental Health Follow up.  Update provided to the medical team. Disposition is pending.  LMSW  will followup as needed.

## 2020-03-02 NOTE — ED PROVIDER NOTE - MUSCULOSKELETAL MINIMAL EXAM
motor intact/ROM intact in RUE. no acute bony tenderness, deformities, or swelling. 5/5 strength throughout all extremities/normal range of motion/TENDERNESS/neck supple

## 2020-03-02 NOTE — ED PROVIDER NOTE - CLINICAL SUMMARY MEDICAL DECISION MAKING FREE TEXT BOX
33yo F presenting with complaints of R arm pain after bed frame falling on the arm. able to range the arm with mild soreness mainly in the shoulder. likely muscle strain however will obtain shoulder XR to r/o Fx. sling for comfort, pain management and ortho f/u.

## 2020-03-03 NOTE — ED ADULT NURSE NOTE - OBJECTIVE STATEMENT
34 year old Female, comes to ER via EMS from home. Report received from JO Ko. Patient was home when she was sitting close to a metal bed frame that fell on her right shoulder. Patient has some minor redness to right shoulder, no bruising or trauma seen. Patient reporting 8/10 pain. Patient also reporting multiple stressors at home causing her to feel down. Patient spoke with  Veronica who does not feel patient is in acute danger from herself or others. Patient provided with information as outpatient for [facility] A&Ox3, breathing spontaneous and unlabored, palpable pulses, denies hitting her head, chest pain, shortness of breath, dizziness at this time. Bed locked and in lowest position with side rails up for safety.

## 2020-03-03 NOTE — ED ADULT NURSE NOTE - NSIMPLEMENTINTERV_GEN_ALL_ED
Implemented All Universal Safety Interventions:  [place] to call system. Call bell, personal items and telephone within reach. Instruct patient to call for assistance. Room bathroom lighting operational. Non-slip footwear when patient is off stretcher. Physically safe environment: no spills, clutter or unnecessary equipment. Stretcher in lowest position, wheels locked, appropriate side rails in place.

## 2020-10-27 ENCOUNTER — EMERGENCY (EMERGENCY)
Facility: HOSPITAL | Age: 35
LOS: 1 days | Discharge: HOME | End: 2020-10-27
Admitting: EMERGENCY MEDICINE
Payer: MEDICAID

## 2020-10-27 VITALS
OXYGEN SATURATION: 100 % | HEIGHT: 63 IN | RESPIRATION RATE: 18 BRPM | TEMPERATURE: 98 F | HEART RATE: 87 BPM | SYSTOLIC BLOOD PRESSURE: 121 MMHG | DIASTOLIC BLOOD PRESSURE: 70 MMHG

## 2020-10-27 PROBLEM — J45.909 UNSPECIFIED ASTHMA, UNCOMPLICATED: Chronic | Status: ACTIVE | Noted: 2020-03-02

## 2020-10-27 PROCEDURE — 99283 EMERGENCY DEPT VISIT LOW MDM: CPT

## 2020-10-27 RX ORDER — IBUPROFEN 200 MG
600 TABLET ORAL ONCE
Refills: 0 | Status: COMPLETED | OUTPATIENT
Start: 2020-10-27 | End: 2020-10-27

## 2020-10-27 RX ADMIN — Medication 600 MILLIGRAM(S): at 16:20

## 2020-10-27 NOTE — ED ADULT NURSE NOTE - NSIMPLEMENTINTERV_GEN_ALL_ED
Implemented All Universal Safety Interventions:  [place] to call system. Call bell, personal items and telephone within reach. Instruct patient to call for assistance. Room bathroom lighting operational. Non-slip footwear when patient is off stretcher. Physically safe environment: no spills, clutter or unnecessary equipment. Stretcher in lowest position, wheels locked, appropriate side rails in place.
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
## 2020-10-27 NOTE — ED PROVIDER NOTE - NS ED ROS FT
+ anxiety, + depression, + actin out behaviour, + agitation , no SI/HI/AH/VH  + right arm and shoulder pain, no visible injuries, +ROM, no swelling, no reddens, not tender to touch

## 2020-10-27 NOTE — ED PROVIDER NOTE - PATIENT PORTAL LINK FT
You can access the FollowMyHealth Patient Portal offered by [facility] by registering at the following website: [email or web address]. By joining [employer]’s FollowMyHealth portal, you will also be able to view your health information using other applications (apps) compatible with our system.
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
## 2020-10-27 NOTE — ED ADULT NURSE NOTE - OBJECTIVE STATEMENT
pt received to , escorted by [facility] under arrest from booking, brought in for eval of agitated behavior. pt denies si/hi. calm and cooperative. medicated as ordered. will continue to monitor.

## 2021-04-08 ENCOUNTER — APPOINTMENT (OUTPATIENT)
Dept: SURGERY | Facility: CLINIC | Age: 36
End: 2021-04-08

## 2021-12-31 NOTE — ED ADULT NURSE NOTE - NSFALLRSKASSESSTYPE_ED_ALL_ED
Bed: EXAM 25  Expected date:   Expected time:   Means of arrival:   Comments:  Uyen   Initial (On Arrival)

## 2022-10-04 NOTE — ED ADULT TRIAGE NOTE - MEANS OF ARRIVAL
Medication:   Requested Prescriptions     Pending Prescriptions Disp Refills    ezetimibe (Eliud Temple) 10 MG tablet [Pharmacy Med Name: EZETIMIBE TABS 10MG] 90 tablet 3     Sig: TAKE 1 TABLET DAILY        Last Filled:      Patient Phone Number: [phone] (home)     Last appt: 8/11/2022   Next appt: 2/14/2023    Last OARRS: No flowsheet data found. stretcher

## 2023-04-03 NOTE — PATIENT PROFILE OB - SPIRITUAL CULTURAL, RELIGIOUS PRACTICES/VALUES, PROFILE
[de-identified] : NECK:\par Inspection: no ecchymosis. \par Palpation: trapezial tenderness. \par Range of motion:  Full range of motion with mild stiffness . Pain at extremes of rotation to right. \par Strength Testing: Weakness with Right Finger Abductors and Grasp\par Normal Deltoid, Biceps, Triceps, Wrist Flexors\par Neurological testing: light touch is intact throughout both upper extremities\par Mendez reflex: neg\par Spurling test: positive\par \par \par \par RIGHT SHOULDER\par Inspection: No swelling. \par Palpation: Tenderness is noted at the bicipital groove, anterior and lateral. \par Range of motion: There is pain with range of motion.\par , ER 55, @90ER 90, @90IR 30\par Strength: There is pain and discomfort with strength testing.\par Forward Flexion 4/5. Abduction 4/5.  External Rotation 5-/5 and Internal Rotation 5/5 \par Neurological testings: motor and sensor intact distally.\par Ligament Stability and Special Tests: \par There is positive arc of pain. \par Shoulder apprehension: neg\par Shoulder relocation: neg\par Giron’s test: pos\par Biceps Active test: neg\par Mendoza Labral Shear: neg\par Impingement testing: pos\par Noe testing: pos\par Whipple: pos\par Cross Body Adduction: neg\par \par 
none

## 2024-03-02 NOTE — ED ADULT NURSE NOTE - NS ED NURSE LEVEL OF CONSCIOUSNESS MENTAL STATUS
Problem: COGNITION/SAFETY  Goal: Patient will recall and adhere to hip precautions during all functional mobility/ADL tasks in order to demonstrate improved understanding and promote healing post op  Outcome: Progressing     Problem: ADLs  Goal: Patient with complete lower body dressing with modified independent level of assistance donning and doffing pants without a zipper/fasteners, underwear, socks, and shoes with DME while supported sitting  Outcome: Progressing     Problem: TRANSFERS  Goal: Patient will complete all functional transfers with front wheeled walker with modified independent level of assistance.  Outcome: Progressing      Alert/Awake

## 2024-10-01 NOTE — ED ADULT TRIAGE NOTE - HEART RATE (BEATS/MIN)
This is a new patient seen over a year ago by me  I will forward this message to NP Jaci Murillo who she is having the appointment with on the 12th    54

## 2025-01-29 NOTE — ED PROVIDER NOTE - DISCHARGE DATE
Pt is calling due to his medication Nilotinib 150 mg is being rejected from pharmacy due to the enrollment form was not received. Patient states the form was dropped off to office 12/2024. He has about 5-6 days left  before out of medication and it takes about 5 business days to receive via mail.     Please contact patient  
27-Oct-2020